# Patient Record
Sex: MALE
[De-identification: names, ages, dates, MRNs, and addresses within clinical notes are randomized per-mention and may not be internally consistent; named-entity substitution may affect disease eponyms.]

---

## 2023-09-01 PROBLEM — Z00.00 ENCOUNTER FOR PREVENTIVE HEALTH EXAMINATION: Status: ACTIVE | Noted: 2023-09-01

## 2023-10-15 ENCOUNTER — RESULT CHARGE (OUTPATIENT)
Age: 53
End: 2023-10-15

## 2023-10-16 ENCOUNTER — APPOINTMENT (OUTPATIENT)
Dept: OTHER | Facility: CLINIC | Age: 53
End: 2023-10-16
Payer: COMMERCIAL

## 2023-10-16 VITALS
OXYGEN SATURATION: 97 % | DIASTOLIC BLOOD PRESSURE: 82 MMHG | HEART RATE: 73 BPM | TEMPERATURE: 98.1 F | WEIGHT: 201 LBS | BODY MASS INDEX: 28.14 KG/M2 | HEIGHT: 71 IN | SYSTOLIC BLOOD PRESSURE: 135 MMHG | RESPIRATION RATE: 18 BRPM

## 2023-10-16 DIAGNOSIS — Z04.9 ENCOUNTER FOR EXAMINATION AND OBSERVATION FOR UNSPECIFIED REASON: ICD-10-CM

## 2023-10-16 DIAGNOSIS — K21.00 GASTRO-ESOPHAGEAL REFLUX DISEASE WITH ESOPHAGITIS, WITHOUT BLEEDING: ICD-10-CM

## 2023-10-16 DIAGNOSIS — K22.70 BARRETT'S ESOPHAGUS W/OUT DYSPLASIA: ICD-10-CM

## 2023-10-16 PROCEDURE — 94010 BREATHING CAPACITY TEST: CPT

## 2023-10-16 PROCEDURE — 99386 PREV VISIT NEW AGE 40-64: CPT | Mod: 25

## 2023-10-16 RX ORDER — PANTOPRAZOLE 40 MG/1
40 TABLET, DELAYED RELEASE ORAL TWICE DAILY
Refills: 0 | Status: ACTIVE | COMMUNITY
Start: 2023-10-16

## 2023-10-17 LAB
ALBUMIN SERPL ELPH-MCNC: 4.2 G/DL
ALP BLD-CCNC: 76 U/L
ALT SERPL-CCNC: 42 U/L
ANION GAP SERPL CALC-SCNC: 11 MMOL/L
APPEARANCE: CLEAR
AST SERPL-CCNC: 30 U/L
BACTERIA: NEGATIVE /HPF
BASOPHILS # BLD AUTO: 0.03 K/UL
BASOPHILS NFR BLD AUTO: 0.5 %
BILIRUB SERPL-MCNC: 0.3 MG/DL
BILIRUBIN URINE: NEGATIVE
BLOOD URINE: NEGATIVE
BUN SERPL-MCNC: 17 MG/DL
CALCIUM SERPL-MCNC: 9.4 MG/DL
CAST: 0 /LPF
CHLORIDE SERPL-SCNC: 105 MMOL/L
CHOLEST SERPL-MCNC: 215 MG/DL
CO2 SERPL-SCNC: 25 MMOL/L
COLOR: YELLOW
CREAT SERPL-MCNC: 1.33 MG/DL
EGFR: 63 ML/MIN/1.73M2
EOSINOPHIL # BLD AUTO: 0.2 K/UL
EOSINOPHIL NFR BLD AUTO: 3.1 %
EPITHELIAL CELLS: 0 /HPF
GLUCOSE QUALITATIVE U: NEGATIVE MG/DL
GLUCOSE SERPL-MCNC: 81 MG/DL
HCT VFR BLD CALC: 45.6 %
HDLC SERPL-MCNC: 39 MG/DL
HGB BLD-MCNC: 14.5 G/DL
IMM GRANULOCYTES NFR BLD AUTO: 0.3 %
KETONES URINE: NEGATIVE MG/DL
LDLC SERPL CALC-MCNC: 139 MG/DL
LEUKOCYTE ESTERASE URINE: NEGATIVE
LYMPHOCYTES # BLD AUTO: 1.22 K/UL
LYMPHOCYTES NFR BLD AUTO: 19 %
MAN DIFF?: NORMAL
MCHC RBC-ENTMCNC: 30.5 PG
MCHC RBC-ENTMCNC: 31.8 GM/DL
MCV RBC AUTO: 95.8 FL
MICROSCOPIC-UA: NORMAL
MONOCYTES # BLD AUTO: 0.68 K/UL
MONOCYTES NFR BLD AUTO: 10.6 %
NEUTROPHILS # BLD AUTO: 4.26 K/UL
NEUTROPHILS NFR BLD AUTO: 66.5 %
NITRITE URINE: NEGATIVE
NONHDLC SERPL-MCNC: 176 MG/DL
PH URINE: 6
PLATELET # BLD AUTO: 235 K/UL
POTASSIUM SERPL-SCNC: 3.7 MMOL/L
PROT SERPL-MCNC: 6.9 G/DL
PROTEIN URINE: NEGATIVE MG/DL
RBC # BLD: 4.76 M/UL
RBC # FLD: 13.2 %
RED BLOOD CELLS URINE: 0 /HPF
SODIUM SERPL-SCNC: 141 MMOL/L
SPECIFIC GRAVITY URINE: 1.01
TRIGL SERPL-MCNC: 205 MG/DL
UROBILINOGEN URINE: 0.2 MG/DL
WBC # FLD AUTO: 6.41 K/UL
WHITE BLOOD CELLS URINE: 0 /HPF

## 2024-09-09 ENCOUNTER — APPOINTMENT (OUTPATIENT)
Dept: COLORECTAL SURGERY | Facility: CLINIC | Age: 54
End: 2024-09-09
Payer: COMMERCIAL

## 2024-09-09 VITALS
SYSTOLIC BLOOD PRESSURE: 130 MMHG | DIASTOLIC BLOOD PRESSURE: 83 MMHG | BODY MASS INDEX: 28.56 KG/M2 | WEIGHT: 204 LBS | HEART RATE: 84 BPM | OXYGEN SATURATION: 97 % | HEIGHT: 71 IN

## 2024-09-09 DIAGNOSIS — K60.0 ACUTE ANAL FISSURE: ICD-10-CM

## 2024-09-09 DIAGNOSIS — Z78.9 OTHER SPECIFIED HEALTH STATUS: ICD-10-CM

## 2024-09-09 DIAGNOSIS — K22.70 BARRETT'S ESOPHAGUS W/OUT DYSPLASIA: ICD-10-CM

## 2024-09-09 DIAGNOSIS — H91.90 UNSPECIFIED HEARING LOSS, UNSPECIFIED EAR: ICD-10-CM

## 2024-09-09 DIAGNOSIS — L29.0 PRURITUS ANI: ICD-10-CM

## 2024-09-09 DIAGNOSIS — K62.89 OTHER SPECIFIED DISEASES OF ANUS AND RECTUM: ICD-10-CM

## 2024-09-09 PROCEDURE — 99204 OFFICE O/P NEW MOD 45 MIN: CPT

## 2024-09-09 RX ORDER — IBUPROFEN 600 MG/1
600 TABLET, FILM COATED ORAL 3 TIMES DAILY
Qty: 20 | Refills: 0 | Status: ACTIVE | COMMUNITY
Start: 2024-09-09 | End: 1900-01-01

## 2024-09-09 RX ORDER — OMEPRAZOLE 40 MG/1
40 CAPSULE, DELAYED RELEASE ORAL
Refills: 0 | Status: ACTIVE | COMMUNITY

## 2024-09-09 NOTE — END OF VISIT
[FreeTextEntry3] : Time documented for this patient's visit includes the time spent reviewing the referral information, any associated laboratory or radiologic imaging.  Time spent with the patient in direct communication and physical examination with assessment and treatment recommendations.  Any time spent with the patient describing the procedures, risks and benefits, posttreatment expectations, future planning.Reviewing the accumulated information and documenting the encounter with creation of appropriate communication and consult letters back to the referring and pertinent physicians, APCs and caregivers. Organizing future scheduling events including but not limited to surgical or procedure scheduling. [Time Spent: ___ minutes] : I have spent [unfilled] minutes of time on the encounter which excludes teaching and separately reported services.

## 2024-09-09 NOTE — PHYSICAL EXAM
[FreeTextEntry1] : Anorectal examination is limited by patient pain.  Visual examination shows a significant pruritus of the anal margin with a edematous swollen erythematous skin appearance.  There is no masses or ulcerations.  Digital rectal exam is limited by significant tenderness and pain in the upper posterior midline anal canal.  Full examination is not possible.  Anoscopic examination is aborted due to patient pain.

## 2024-09-09 NOTE — CONSULT LETTER
[Dear  ___] : Dear  [unfilled], [Please see my note below.] : Please see my note below. [Sincerely,] : Sincerely, [FreeTextEntry2] : Pito Rehman MD [FreeTextEntry1] :  Thank you for your referral of Mr. Schreiber.  Examination the office was limited due to patient pain/discomfort/tenderness.  We are moving forward with expedited exam under anesthesia in the operating room.  I will return to you with the results of additional workup and hopefully diagnosis with appropriate treatment plan. [FreeTextEntry3] : Shashi Byrne MD [facility]

## 2024-09-09 NOTE — ASSESSMENT
[FreeTextEntry1] : 54-year-old gentleman with a approximately 2-month history of anal pain, which is not constant, which is worse when the patient sits and relieved when the patient stands.  Unclear significance.  The patient also has a significant anal pruritus.  Normally anal pruritus is due to an excessive discharge from engorged irritated internal hemorrhoids, or inflammatory rectal mucosal proctitis issues.  With the recent colonoscopy being normal I am focused on inflammatory conditions within the anal canal that could lead to an excessive mucousy discharge out onto the anal margin with resultant pruritic symptoms.  Benny reports that he had a CAT scan for diverticulitis in early August, perhaps late July.  He reports that he had the anal pain during that time.  We are reaching out to obtain both the report and the images on this CAT scan for my review.  Meanwhile we are moving forward with exam under anesthesia in the operating room.  This will give me an opportunity to do a complete and thorough anorectal examination, focusing on the posterior upper midline anal canal area which is the site of the greatest pain tenderness today.  Meanwhile I provided him a sample of Calmoseptine for the pruritus ani issues.

## 2024-09-09 NOTE — HISTORY OF PRESENT ILLNESS
[FreeTextEntry1] : Benny Schreiber is a 54-year-old gentleman who presents for evaluation and management of a approximately 2-month history of significant anorectal pain which is worse when he sits down, less when he stands up.  He denies significant pain during a bowel movement.  There is also an associated severe pruritus of the anal margin skin. He denies a history of bright red blood per rectum with bowel movements.  He denies hemorrhoidal prolapse during bowel movements.  He has not had previous anorectal issues in the past.  He recently underwent colonoscopic evaluation with Dr. Palacio where there were no anorectal abnormal findings.  eBnny reports he moves his bowel movements approximately twice a day without change he does not struggle from constipation.  He has not had a previous similar episode.  The patient also reports that there has been a change in his erection during this time.  He denies a known history of prostate problems.  He does not believe he has had PSA measurements.  At this time he does not have a family doctor.

## 2024-09-19 ENCOUNTER — APPOINTMENT (OUTPATIENT)
Dept: COLORECTAL SURGERY | Facility: HOSPITAL | Age: 54
End: 2024-09-19

## 2025-04-01 ENCOUNTER — APPOINTMENT (OUTPATIENT)
Dept: COLORECTAL SURGERY | Facility: HOSPITAL | Age: 55
End: 2025-04-01